# Patient Record
Sex: FEMALE | Race: AMERICAN INDIAN OR ALASKA NATIVE | URBAN - METROPOLITAN AREA
[De-identification: names, ages, dates, MRNs, and addresses within clinical notes are randomized per-mention and may not be internally consistent; named-entity substitution may affect disease eponyms.]

---

## 2023-02-27 ENCOUNTER — CLAIMS CREATED FROM THE CLAIM WINDOW (OUTPATIENT)
Dept: URBAN - METROPOLITAN AREA CLINIC 96 | Facility: CLINIC | Age: 78
End: 2023-02-27
Payer: MEDICARE

## 2023-02-27 ENCOUNTER — WEB ENCOUNTER (OUTPATIENT)
Dept: URBAN - METROPOLITAN AREA CLINIC 96 | Facility: CLINIC | Age: 78
End: 2023-02-27

## 2023-02-27 VITALS
WEIGHT: 154 LBS | TEMPERATURE: 96.4 F | HEART RATE: 76 BPM | DIASTOLIC BLOOD PRESSURE: 62 MMHG | SYSTOLIC BLOOD PRESSURE: 145 MMHG | BODY MASS INDEX: 26.29 KG/M2 | HEIGHT: 64 IN

## 2023-02-27 DIAGNOSIS — R19.7 ACUTE DIARRHEA: ICD-10-CM

## 2023-02-27 DIAGNOSIS — Z85.3 PERSONAL HISTORY OF BREAST CANCER: ICD-10-CM

## 2023-02-27 DIAGNOSIS — K59.1 FUNCTIONAL DIARRHEA: ICD-10-CM

## 2023-02-27 DIAGNOSIS — R10.84 ABDOMINAL CRAMPING, GENERALIZED: ICD-10-CM

## 2023-02-27 DIAGNOSIS — R11.0 NAUSEA: ICD-10-CM

## 2023-02-27 DIAGNOSIS — R10.30 LOWER ABDOMINAL PAIN: ICD-10-CM

## 2023-02-27 PROBLEM — 429087003: Status: ACTIVE | Noted: 2023-02-27

## 2023-02-27 PROCEDURE — 99204 OFFICE O/P NEW MOD 45 MIN: CPT | Performed by: INTERNAL MEDICINE

## 2023-02-27 PROCEDURE — 99244 OFF/OP CNSLTJ NEW/EST MOD 40: CPT | Performed by: INTERNAL MEDICINE

## 2023-02-27 RX ORDER — METRONIDAZOLE 250 MG/1
1 TABLET TABLET ORAL THREE TIMES A DAY
Qty: 15 TABLET | Refills: 0 | OUTPATIENT

## 2023-02-27 RX ORDER — DICYCLOMINE HYDROCHLORIDE 10 MG/1
1 CAPSULES CAPSULE ORAL
Qty: 60 | Refills: 1 | OUTPATIENT

## 2023-02-27 NOTE — HPI-TODAY'S VISIT:
This patient was referred by Dr. Santhosh Caro for an evaluation of pain / diarrhea.  A copy of this will be sent to the referring provider.  77 y.o. WF Not sure if you can help or not Started w/ Ozempic - not sure beg of problems, xander sort of is Have wt loss (which is good), but diarrhea and constipation - stopped taking it in beg of Dec - syx didn't peter, so just restarted it - no worse w/ restarting Ozempic Past 2-3 weeks a lot of lower abdominal - can be labor pains Diarrhea very sporadic - can keep her from leaving house No nighttime syx No blood in stool Improvement in pain w/ bm Has to remember to eat w/ Ozempic Nausea occ - has Rx for it Not having emesis Feels like this is chronic - not new - just that med kicked it off

## 2023-03-01 ENCOUNTER — P2P PATIENT RECORD (OUTPATIENT)
Age: 78
End: 2023-03-01

## 2023-04-10 ENCOUNTER — OFFICE VISIT (OUTPATIENT)
Dept: URBAN - METROPOLITAN AREA CLINIC 96 | Facility: CLINIC | Age: 78
End: 2023-04-10

## 2023-04-10 RX ORDER — METRONIDAZOLE 250 MG/1
1 TABLET TABLET ORAL THREE TIMES A DAY
Qty: 15 TABLET | Refills: 0 | Status: ACTIVE | COMMUNITY

## 2023-04-10 RX ORDER — DICYCLOMINE HYDROCHLORIDE 10 MG/1
1 CAPSULES CAPSULE ORAL
Qty: 60 | Refills: 1 | Status: ACTIVE | COMMUNITY

## 2023-05-08 ENCOUNTER — WEB ENCOUNTER (OUTPATIENT)
Dept: URBAN - METROPOLITAN AREA CLINIC 96 | Facility: CLINIC | Age: 78
End: 2023-05-08

## 2023-05-08 ENCOUNTER — OFFICE VISIT (OUTPATIENT)
Dept: URBAN - METROPOLITAN AREA CLINIC 96 | Facility: CLINIC | Age: 78
End: 2023-05-08
Payer: MEDICARE

## 2023-05-08 ENCOUNTER — LAB OUTSIDE AN ENCOUNTER (OUTPATIENT)
Dept: URBAN - METROPOLITAN AREA CLINIC 96 | Facility: CLINIC | Age: 78
End: 2023-05-08

## 2023-05-08 VITALS
TEMPERATURE: 97.5 F | DIASTOLIC BLOOD PRESSURE: 59 MMHG | SYSTOLIC BLOOD PRESSURE: 148 MMHG | HEART RATE: 78 BPM | WEIGHT: 158.8 LBS | BODY MASS INDEX: 28.14 KG/M2 | HEIGHT: 63 IN

## 2023-05-08 DIAGNOSIS — R10.30 LOWER ABDOMINAL PAIN: ICD-10-CM

## 2023-05-08 DIAGNOSIS — R11.0 NAUSEA: ICD-10-CM

## 2023-05-08 DIAGNOSIS — K59.04 CHRONIC IDIOPATHIC CONSTIPATION: ICD-10-CM

## 2023-05-08 DIAGNOSIS — K59.1 FUNCTIONAL DIARRHEA: ICD-10-CM

## 2023-05-08 DIAGNOSIS — Z85.3 PERSONAL HISTORY OF BREAST CANCER: ICD-10-CM

## 2023-05-08 PROBLEM — 82934008: Status: ACTIVE | Noted: 2023-05-08

## 2023-05-08 PROCEDURE — 99214 OFFICE O/P EST MOD 30 MIN: CPT | Performed by: PHYSICIAN ASSISTANT

## 2023-05-08 RX ORDER — DICYCLOMINE HYDROCHLORIDE 10 MG/1
1 CAPSULES CAPSULE ORAL
Qty: 60 | Refills: 1 | OUTPATIENT

## 2023-05-08 RX ORDER — METRONIDAZOLE 250 MG/1
1 TABLET TABLET ORAL THREE TIMES A DAY
Qty: 15 TABLET | Refills: 0 | Status: DISCONTINUED | COMMUNITY

## 2023-05-08 RX ORDER — DICYCLOMINE HYDROCHLORIDE 10 MG/1
1 CAPSULES CAPSULE ORAL
Qty: 60 | Refills: 1 | Status: ACTIVE | COMMUNITY

## 2023-05-08 NOTE — HPI-TODAY'S VISIT:
Pt was doing great at first - took the Dicyclomine, Flagyl, Benefiber, cut out the dairy, onions and garlic everything was wonderful and then 9 days ago she went up to the therapeurtic dose of the Ozempic and then "all hell broke loose" increased nausea, constipation, dizzyness, shortness of breath (TORRES)  scheduled to see PCP next week she does not want to take anymore Ozempic - has not taken it since 9 days ago 2 days ago she felt enough better to go get her hair cut Yesterday she thought she was doing a lot better until last night she felt like a switch went off and the nausea and pain got worse she gets cramps in different parts of her abdomen - radiates all over no emesis she has been passing small stools - no complete evacuation - only eating very little Zofran helps a lot with the nausea  has also taken Senna twice and it helped Ativan has helped a lot with the nausea when she sleeps - she had it from when she had cancer years ago and it helped with her nausea she has been having a lot of sinus issues as well and seeing ENT tomorrow regarding this - ? if the dizziness is being caused by sinus issues/vertigo type symptoms Last colon 4/2019 -- wnl - recommended repeat in 5 years

## 2023-05-08 NOTE — PHYSICAL EXAM GASTROINTESTINAL
Abdomen, soft, tender diffusely , nondistended, no guarding or rigidity, no masses palpable, normal bowel sounds, Liver and Spleen, no hepatomegaly present, no hepatosplenomegaly, liver nontenderRectal, deferred

## 2023-05-11 ENCOUNTER — WEB ENCOUNTER (OUTPATIENT)
Dept: URBAN - METROPOLITAN AREA CLINIC 96 | Facility: CLINIC | Age: 78
End: 2023-05-11

## 2023-06-16 ENCOUNTER — TELEPHONE ENCOUNTER (OUTPATIENT)
Dept: URBAN - METROPOLITAN AREA CLINIC 96 | Facility: CLINIC | Age: 78
End: 2023-06-16

## 2024-01-10 ENCOUNTER — P2P PATIENT RECORD (OUTPATIENT)
Age: 79
End: 2024-01-10

## 2024-04-08 ENCOUNTER — OV EP (OUTPATIENT)
Dept: URBAN - METROPOLITAN AREA CLINIC 96 | Facility: CLINIC | Age: 79
End: 2024-04-08
Payer: MEDICARE

## 2024-04-08 VITALS
WEIGHT: 148 LBS | HEART RATE: 87 BPM | DIASTOLIC BLOOD PRESSURE: 55 MMHG | BODY MASS INDEX: 25.27 KG/M2 | TEMPERATURE: 97.7 F | SYSTOLIC BLOOD PRESSURE: 135 MMHG | HEIGHT: 64 IN

## 2024-04-08 DIAGNOSIS — R10.9 ABDOMINAL DISTRESS: ICD-10-CM

## 2024-04-08 PROCEDURE — 99213 OFFICE O/P EST LOW 20 MIN: CPT | Performed by: INTERNAL MEDICINE

## 2024-04-08 NOTE — HPI-TODAY'S VISIT:
79 yo. WF  PCP - [facility] Seen by GILL[facility] Steele just under 1 year ago  Really pretty good Yesterday pretty crappy, but good Got hold of dietician - been doing Low FODMAP diet - seems like helps  Humbled by this - sensitive to glucose and lactose A good 80% better Miracle cure Except for a few events, haven't had diarrhea w/ new diet Dicyclomine is a miracle drug - when I need it, I need it - don't use it often Due for physical in July  Been in a drug study recently - breast cancer - studied to death